# Patient Record
Sex: FEMALE | Race: ASIAN
[De-identification: names, ages, dates, MRNs, and addresses within clinical notes are randomized per-mention and may not be internally consistent; named-entity substitution may affect disease eponyms.]

---

## 2019-05-31 ENCOUNTER — APPOINTMENT (OUTPATIENT)
Dept: NEUROSURGERY | Facility: CLINIC | Age: 54
End: 2019-05-31
Payer: MEDICAID

## 2019-05-31 VITALS — HEIGHT: 63 IN | BODY MASS INDEX: 24.8 KG/M2 | WEIGHT: 140 LBS

## 2019-05-31 DIAGNOSIS — Z78.9 OTHER SPECIFIED HEALTH STATUS: ICD-10-CM

## 2019-05-31 PROBLEM — Z00.00 ENCOUNTER FOR PREVENTIVE HEALTH EXAMINATION: Status: ACTIVE | Noted: 2019-05-31

## 2019-05-31 PROCEDURE — 99204 OFFICE O/P NEW MOD 45 MIN: CPT

## 2019-06-04 PROBLEM — Z78.9 NON-SMOKER: Status: ACTIVE | Noted: 2019-06-04

## 2019-06-04 PROBLEM — Z78.9 NO PERTINENT PAST MEDICAL HISTORY: Status: RESOLVED | Noted: 2019-06-04 | Resolved: 2019-06-04

## 2019-06-04 NOTE — DATA REVIEWED
[de-identified] : \par - MRI of the brain demonstrated a left CP angle tumor with a dural tail measuring 2.5 x .74 cm. This is consistent with a meningioma.

## 2019-06-04 NOTE — HISTORY OF PRESENT ILLNESS
[de-identified] : Ms. Saul presented with intermittent vertigo years ago dating back 10-15 years while in [place]. the vertigo would last a short period of time then would resolve. In the early part of this year she has been having more frequent episodes. These episode are short lived. In a weeks time she would experience 1-2 episodes. Since March she has been doing well. She denies headaches. Recent MRI of the brain demonstrated a left CP angle meningioma.

## 2019-06-04 NOTE — ASSESSMENT
[FreeTextEntry1] : We had a long discussion regarding her condition findings and potential treatment options. At this time I do not believe that her intermittent episode of vertigo is related to this tumor. If needed she may need to consult with an ENT specialist regarding inner ear disease or she may also benefit from physical therapy. At this time she does not feel this is necessary. She will undergo a repeat MRI of the brain with/without gado in 6 months to assess stability of her newly diagnosed meningoma. She understood our discussion well.

## 2019-06-04 NOTE — PHYSICAL EXAM
[General Appearance - Alert] : alert [General Appearance - In No Acute Distress] : in no acute distress [General Appearance - Well Nourished] : well nourished [General Appearance - Well Developed] : well developed [General Appearance - Well-Appearing] : healthy appearing [Oriented To Time, Place, And Person] : oriented to person, place, and time [Affect] : the affect was normal [Cranial Nerves Optic (II)] : visual acuity intact bilaterally,  pupils equal round and reactive to light [Cranial Nerves Oculomotor (III)] : extraocular motion intact [Cranial Nerves Trigeminal (V)] : facial sensation intact symmetrically [Cranial Nerves Facial (VII)] : face symmetrical [Cranial Nerves Vestibulocochlear (VIII)] : hearing was intact bilaterally [Cranial Nerves Glossopharyngeal (IX)] : tongue and palate midline [Cranial Nerves Accessory (XI - Cranial And Spinal)] : head turning and shoulder shrug symmetric [Cranial Nerves Hypoglossal (XII)] : there was no tongue deviation with protrusion [Motor Tone] : muscle tone was normal in all four extremities [Motor Strength] : muscle strength was normal in all four extremities [Proprioception] : proprioception was intact [Abnormal Walk] : normal gait [Normal] : normal [Able to toe walk] : the patient was able to toe walk [Able to heel walk] : the patient was able to heel walk [Coordination - Dysmetria Impaired Finger-to-Nose Bilateral] : not present

## 2020-02-08 ENCOUNTER — APPOINTMENT (OUTPATIENT)
Dept: NEUROSURGERY | Facility: CLINIC | Age: 55
End: 2020-02-08
Payer: MEDICAID

## 2020-02-08 PROCEDURE — 99215 OFFICE O/P EST HI 40 MIN: CPT

## 2020-02-08 NOTE — ASSESSMENT
[FreeTextEntry1] : No neurological complaints. No dizziness or vertigo. No headaches.\par \par On exam, Gait steady. Motor intact. No facial asymmetry, EOMI. Speech fluent. Face sensation intact. Motor intact. \par \par I reviewed the recent MRI of the brain. No significant changes in the characteristics or size of the left CP angle meningioma although the earlier CT from 9 months ago was performed in a different facility.\par \par Will proceed with f/u  MRI in 6 months in the same facility before I see her again.

## 2022-01-21 ENCOUNTER — APPOINTMENT (OUTPATIENT)
Dept: NEUROSURGERY | Facility: CLINIC | Age: 57
End: 2022-01-21
Payer: MEDICAID

## 2022-01-21 DIAGNOSIS — D32.0 BENIGN NEOPLASM OF CEREBRAL MENINGES: ICD-10-CM

## 2022-01-21 PROCEDURE — 99214 OFFICE O/P EST MOD 30 MIN: CPT

## 2022-01-21 NOTE — PHYSICAL EXAM
[FreeTextEntry1] : awake alert oriented \par EOM intact, visual field full to confrontation bilaterally \par No facial weakness or numbness\par hearing intact bilaterally, equal shoulder shrug \par No pronator drift, no focal limb weakness \par Strength intact in both UE and LE \par Sensation grossly intact bilaterally to light touch \par

## 2022-01-21 NOTE — HISTORY OF PRESENT ILLNESS
[FreeTextEntry1] : 57 y/o female with known CP angle meningioma presented for follow up visit. She reports that she has been feeling some short term memory loss but does not have any unilateral weakness, paresthesia. Denies any diplopia, facial numbness, or any visual disturbance. She had a new MRI brain this month and is here for follow up.

## 2022-01-21 NOTE — REASON FOR VISIT
[Follow-Up: _____] : a [unfilled] follow-up visit [FreeTextEntry1] : 55 y/o female presents for follow up on known brain tumor.